# Patient Record
Sex: FEMALE | Race: BLACK OR AFRICAN AMERICAN | NOT HISPANIC OR LATINO | URBAN - METROPOLITAN AREA
[De-identification: names, ages, dates, MRNs, and addresses within clinical notes are randomized per-mention and may not be internally consistent; named-entity substitution may affect disease eponyms.]

---

## 2022-08-17 ENCOUNTER — TELEPHONE (OUTPATIENT)
Dept: OBSTETRICS AND GYNECOLOGY | Facility: CLINIC | Age: 62
End: 2022-08-17

## 2022-08-17 NOTE — TELEPHONE ENCOUNTER
----- Message from Tessa Ball sent at 8/17/2022  2:29 PM CDT -----  Contact: patient, [phone]  Patient has 2 charts other MRN is [id number]. She was called to reschedule her appointment. Please call her. Thanks.

## 2022-08-17 NOTE — TELEPHONE ENCOUNTER
Spoke with Pt in regards to appt Pt states she will  Find someone in network       Libertad ECHEVERRIA LPN  OB/GYN